# Patient Record
Sex: MALE | Race: WHITE | NOT HISPANIC OR LATINO | Employment: OTHER | URBAN - METROPOLITAN AREA
[De-identification: names, ages, dates, MRNs, and addresses within clinical notes are randomized per-mention and may not be internally consistent; named-entity substitution may affect disease eponyms.]

---

## 2017-03-29 ENCOUNTER — FOLLOW UP (OUTPATIENT)
Dept: URBAN - METROPOLITAN AREA CLINIC 26 | Facility: CLINIC | Age: 82
End: 2017-03-29

## 2017-03-29 VITALS — HEART RATE: 45 BPM | SYSTOLIC BLOOD PRESSURE: 160 MMHG | HEIGHT: 60 IN | DIASTOLIC BLOOD PRESSURE: 88 MMHG

## 2017-03-29 DIAGNOSIS — H35.373: ICD-10-CM

## 2017-03-29 DIAGNOSIS — H43.393: ICD-10-CM

## 2017-03-29 DIAGNOSIS — H02.836: ICD-10-CM

## 2017-03-29 DIAGNOSIS — H43.813: ICD-10-CM

## 2017-03-29 DIAGNOSIS — H04.123: ICD-10-CM

## 2017-03-29 DIAGNOSIS — H02.833: ICD-10-CM

## 2017-03-29 DIAGNOSIS — H35.3131: ICD-10-CM

## 2017-03-29 PROCEDURE — 92250 FUNDUS PHOTOGRAPHY W/I&R: CPT

## 2017-03-29 PROCEDURE — 1036F TOBACCO NON-USER: CPT

## 2017-03-29 PROCEDURE — G8427 DOCREV CUR MEDS BY ELIG CLIN: HCPCS

## 2017-03-29 PROCEDURE — 4177F TALK PT/CRGVR RE AREDS PREV: CPT

## 2017-03-29 PROCEDURE — G8417 CALC BMI ABV UP PARAM F/U: HCPCS

## 2017-03-29 PROCEDURE — 92014 COMPRE OPH EXAM EST PT 1/>: CPT

## 2017-03-29 PROCEDURE — 2019F DILATED MACUL EXAM DONE: CPT

## 2017-03-29 ASSESSMENT — VISUAL ACUITY
OS_SC: 20/50-1
OD_SC: 20/20-1
OS_PH: 20/30

## 2017-03-29 ASSESSMENT — TONOMETRY
OD_IOP_MMHG: 13
OS_IOP_MMHG: 13

## 2018-03-29 ENCOUNTER — FOLLOW UP (OUTPATIENT)
Dept: URBAN - METROPOLITAN AREA CLINIC 26 | Facility: CLINIC | Age: 83
End: 2018-03-29

## 2018-03-29 VITALS
SYSTOLIC BLOOD PRESSURE: 162 MMHG | WEIGHT: 160 LBS | DIASTOLIC BLOOD PRESSURE: 85 MMHG | HEIGHT: 66 IN | HEART RATE: 48 BPM | BODY MASS INDEX: 25.71 KG/M2

## 2018-03-29 DIAGNOSIS — H02.833: ICD-10-CM

## 2018-03-29 DIAGNOSIS — H43.813: ICD-10-CM

## 2018-03-29 DIAGNOSIS — H43.393: ICD-10-CM

## 2018-03-29 DIAGNOSIS — H35.3131: ICD-10-CM

## 2018-03-29 DIAGNOSIS — H02.836: ICD-10-CM

## 2018-03-29 DIAGNOSIS — H35.373: ICD-10-CM

## 2018-03-29 DIAGNOSIS — H04.123: ICD-10-CM

## 2018-03-29 PROCEDURE — 92250 FUNDUS PHOTOGRAPHY W/I&R: CPT

## 2018-03-29 PROCEDURE — 92014 COMPRE OPH EXAM EST PT 1/>: CPT

## 2018-03-29 PROCEDURE — 92134 CPTRZ OPH DX IMG PST SGM RTA: CPT

## 2018-03-29 ASSESSMENT — VISUAL ACUITY
OD_SC: 20/20-1
OS_SC: 20/25-2

## 2018-03-29 ASSESSMENT — TONOMETRY
OS_IOP_MMHG: 12
OD_IOP_MMHG: 12

## 2019-03-28 ENCOUNTER — FOLLOW UP (OUTPATIENT)
Dept: URBAN - METROPOLITAN AREA CLINIC 26 | Facility: CLINIC | Age: 84
End: 2019-03-28

## 2019-03-28 VITALS — WEIGHT: 145 LBS | HEIGHT: 63 IN | BODY MASS INDEX: 25.69 KG/M2

## 2019-03-28 DIAGNOSIS — H35.373: ICD-10-CM

## 2019-03-28 DIAGNOSIS — H43.813: ICD-10-CM

## 2019-03-28 DIAGNOSIS — H35.3131: ICD-10-CM

## 2019-03-28 DIAGNOSIS — H43.393: ICD-10-CM

## 2019-03-28 PROCEDURE — 92250 FUNDUS PHOTOGRAPHY W/I&R: CPT

## 2019-03-28 PROCEDURE — 92134 CPTRZ OPH DX IMG PST SGM RTA: CPT

## 2019-03-28 PROCEDURE — 92014 COMPRE OPH EXAM EST PT 1/>: CPT

## 2019-03-28 ASSESSMENT — VISUAL ACUITY
OD_SC: 20/20-2
OS_SC: 20/40-2

## 2019-03-28 ASSESSMENT — TONOMETRY
OD_IOP_MMHG: 13
OS_IOP_MMHG: 12

## 2020-05-19 NOTE — PATIENT DISCUSSION
Increased Atrophy, VA remains stable, will refer to Manuelito Tamayo in future for gene trial. Monitor.

## 2022-05-17 NOTE — PATIENT DISCUSSION
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   Will monitor.  Patient to F/U w/Dr. Siri Camejo at [facility].